# Patient Record
Sex: MALE | Race: WHITE | NOT HISPANIC OR LATINO | Employment: FULL TIME | URBAN - METROPOLITAN AREA
[De-identification: names, ages, dates, MRNs, and addresses within clinical notes are randomized per-mention and may not be internally consistent; named-entity substitution may affect disease eponyms.]

---

## 2023-10-08 ASSESSMENT — PROMIS GLOBAL HEALTH SCALE
RATE_QUALITY_OF_LIFE: GOOD
RATE_GENERAL_HEALTH: GOOD
EMOTIONAL_PROBLEMS: NEVER
CARRYOUT_SOCIAL_ACTIVITIES: VERY GOOD
RATE_SOCIAL_SATISFACTION: VERY GOOD
CARRYOUT_PHYSICAL_ACTIVITIES: COMPLETELY
RATE_MENTAL_HEALTH: VERY GOOD
RATE_PHYSICAL_HEALTH: VERY GOOD
RATE_AVERAGE_PAIN: 3

## 2023-10-12 ENCOUNTER — OFFICE VISIT (OUTPATIENT)
Dept: PRIMARY CARE | Facility: CLINIC | Age: 53
End: 2023-10-12
Payer: COMMERCIAL

## 2023-10-12 VITALS
HEIGHT: 68 IN | SYSTOLIC BLOOD PRESSURE: 134 MMHG | WEIGHT: 182 LBS | TEMPERATURE: 98.2 F | RESPIRATION RATE: 21 BRPM | HEART RATE: 68 BPM | DIASTOLIC BLOOD PRESSURE: 84 MMHG | OXYGEN SATURATION: 98 % | BODY MASS INDEX: 27.58 KG/M2

## 2023-10-12 DIAGNOSIS — Z00.00 ROUTINE MEDICAL EXAM: Primary | ICD-10-CM

## 2023-10-12 DIAGNOSIS — Z12.5 SCREENING FOR PROSTATE CANCER: ICD-10-CM

## 2023-10-12 LAB
ALBUMIN SERPL-MCNC: 4.7 G/DL (ref 3.5–5)
ALP BLD-CCNC: 117 U/L (ref 35–125)
ALT SERPL-CCNC: 21 U/L (ref 5–40)
ANION GAP SERPL CALC-SCNC: 14 MMOL/L
AST SERPL-CCNC: 17 U/L (ref 5–40)
BASOPHILS # BLD AUTO: 0.04 X10*3/UL (ref 0–0.1)
BASOPHILS NFR BLD AUTO: 0.7 %
BILIRUB SERPL-MCNC: 2.4 MG/DL (ref 0.1–1.2)
BUN SERPL-MCNC: 11 MG/DL (ref 8–25)
CALCIUM SERPL-MCNC: 9.4 MG/DL (ref 8.5–10.4)
CHLORIDE SERPL-SCNC: 104 MMOL/L (ref 97–107)
CHOLEST SERPL-MCNC: 229 MG/DL (ref 133–200)
CHOLEST/HDLC SERPL: 5.5 {RATIO}
CO2 SERPL-SCNC: 25 MMOL/L (ref 24–31)
CREAT SERPL-MCNC: 0.9 MG/DL (ref 0.4–1.6)
EOSINOPHIL # BLD AUTO: 0.03 X10*3/UL (ref 0–0.7)
EOSINOPHIL NFR BLD AUTO: 0.5 %
ERYTHROCYTE [DISTWIDTH] IN BLOOD BY AUTOMATED COUNT: 12.9 % (ref 11.5–14.5)
GFR SERPL CREATININE-BSD FRML MDRD: >90 ML/MIN/1.73M*2
GLUCOSE SERPL-MCNC: 104 MG/DL (ref 65–99)
HCT VFR BLD AUTO: 45.3 % (ref 41–52)
HDLC SERPL-MCNC: 42 MG/DL
HGB BLD-MCNC: 15 G/DL (ref 13.5–17.5)
IMM GRANULOCYTES # BLD AUTO: 0.01 X10*3/UL (ref 0–0.7)
IMM GRANULOCYTES NFR BLD AUTO: 0.2 % (ref 0–0.9)
LDLC SERPL CALC-MCNC: 172 MG/DL (ref 65–130)
LYMPHOCYTES # BLD AUTO: 1.37 X10*3/UL (ref 1.2–4.8)
LYMPHOCYTES NFR BLD AUTO: 24.2 %
MCH RBC QN AUTO: 30.1 PG (ref 26–34)
MCHC RBC AUTO-ENTMCNC: 33.1 G/DL (ref 32–36)
MCV RBC AUTO: 91 FL (ref 80–100)
MONOCYTES # BLD AUTO: 0.48 X10*3/UL (ref 0.1–1)
MONOCYTES NFR BLD AUTO: 8.5 %
NEUTROPHILS # BLD AUTO: 3.74 X10*3/UL (ref 1.2–7.7)
NEUTROPHILS NFR BLD AUTO: 65.9 %
NRBC BLD-RTO: 0 /100 WBCS (ref 0–0)
PLATELET # BLD AUTO: 291 X10*3/UL (ref 150–450)
PMV BLD AUTO: 10.4 FL (ref 7.5–11.5)
POTASSIUM SERPL-SCNC: 4.2 MMOL/L (ref 3.4–5.1)
PROT SERPL-MCNC: 7.2 G/DL (ref 5.9–7.9)
PSA SERPL-MCNC: 1.2 NG/ML
RBC # BLD AUTO: 4.98 X10*6/UL (ref 4.5–5.9)
SODIUM SERPL-SCNC: 143 MMOL/L (ref 133–145)
TRIGL SERPL-MCNC: 76 MG/DL (ref 40–150)
TSH SERPL DL<=0.05 MIU/L-ACNC: 1.46 MIU/L (ref 0.27–4.2)
WBC # BLD AUTO: 5.7 X10*3/UL (ref 4.4–11.3)

## 2023-10-12 PROCEDURE — 36415 COLL VENOUS BLD VENIPUNCTURE: CPT | Performed by: FAMILY MEDICINE

## 2023-10-12 PROCEDURE — 84443 ASSAY THYROID STIM HORMONE: CPT | Performed by: FAMILY MEDICINE

## 2023-10-12 PROCEDURE — 84153 ASSAY OF PSA TOTAL: CPT | Performed by: FAMILY MEDICINE

## 2023-10-12 PROCEDURE — 99386 PREV VISIT NEW AGE 40-64: CPT | Performed by: FAMILY MEDICINE

## 2023-10-12 PROCEDURE — 80061 LIPID PANEL: CPT | Performed by: FAMILY MEDICINE

## 2023-10-12 PROCEDURE — 85025 COMPLETE CBC W/AUTO DIFF WBC: CPT | Performed by: FAMILY MEDICINE

## 2023-10-12 PROCEDURE — 80053 COMPREHEN METABOLIC PANEL: CPT | Performed by: FAMILY MEDICINE

## 2023-10-12 PROCEDURE — 1036F TOBACCO NON-USER: CPT | Performed by: FAMILY MEDICINE

## 2023-10-12 ASSESSMENT — ENCOUNTER SYMPTOMS
CONSTITUTIONAL NEGATIVE: 1
RESPIRATORY NEGATIVE: 1
MUSCULOSKELETAL NEGATIVE: 1
NEUROLOGICAL NEGATIVE: 1
GASTROINTESTINAL NEGATIVE: 1
CARDIOVASCULAR NEGATIVE: 1

## 2023-10-12 ASSESSMENT — PAIN SCALES - GENERAL: PAINLEVEL: 0-NO PAIN

## 2023-10-12 NOTE — PROGRESS NOTES
"Subjective   Patient ID: Carson Harris is a 53 y.o. male who presents for Annual Exam (HERE FOR A PHYSICAL AND FASTING BLOODWORK).    HPI father with a fib.  MGF with heart disease    Review of Systems   Constitutional: Negative.    HENT: Negative.     Respiratory: Negative.     Cardiovascular: Negative.    Gastrointestinal: Negative.    Genitourinary: Negative.    Musculoskeletal: Negative.    Neurological: Negative.        Objective   /84 (BP Location: Right arm, Patient Position: Sitting, BP Cuff Size: Adult)   Pulse 68   Temp 36.8 °C (98.2 °F) (Oral)   Resp 21   Ht 1.727 m (5' 8\")   Wt 82.6 kg (182 lb)   SpO2 98%   BMI 27.67 kg/m²     Physical Exam  Vitals and nursing note reviewed.   Constitutional:       General: He is not in acute distress.  HENT:      Right Ear: Tympanic membrane and ear canal normal.      Left Ear: Tympanic membrane and ear canal normal.      Nose: Nose normal. No rhinorrhea.      Mouth/Throat:      Pharynx: Oropharynx is clear. No oropharyngeal exudate or posterior oropharyngeal erythema.      Comments: Dentition wnl  Eyes:      Extraocular Movements: Extraocular movements intact.      Conjunctiva/sclera: Conjunctivae normal.      Pupils: Pupils are equal, round, and reactive to light.   Neck:      Vascular: No carotid bruit.   Cardiovascular:      Rate and Rhythm: Normal rate and regular rhythm.      Heart sounds: Normal heart sounds. No murmur heard.  Pulmonary:      Breath sounds: Normal breath sounds. No wheezing or rhonchi.   Abdominal:      General: Bowel sounds are normal. There is no distension.      Palpations: Abdomen is soft. There is no mass.      Tenderness: There is no abdominal tenderness. There is no guarding or rebound.      Hernia: No hernia is present.   Musculoskeletal:         General: No swelling or tenderness. Normal range of motion.      Cervical back: Normal range of motion and neck supple.   Lymphadenopathy:      Cervical: No cervical adenopathy. "   Skin:     General: Skin is warm.      Findings: No rash.   Neurological:      General: No focal deficit present.      Mental Status: He is alert.         Assessment/Plan   Problem List Items Addressed This Visit    None  Visit Diagnoses         Codes    Routine medical exam    -  Primary Z00.00    Relevant Orders    CBC and Auto Differential    Comprehensive Metabolic Panel    TSH with reflex to Free T4 if abnormal    Lipid Panel    CT cardiac scoring wo IV contrast    Screening for prostate cancer     Z12.5    Relevant Orders    Prostate Specific Antigen

## 2023-11-10 ENCOUNTER — ANCILLARY PROCEDURE (OUTPATIENT)
Dept: RADIOLOGY | Facility: CLINIC | Age: 53
End: 2023-11-10
Payer: COMMERCIAL

## 2023-11-10 DIAGNOSIS — Z00.00 ROUTINE MEDICAL EXAM: ICD-10-CM

## 2023-11-10 PROCEDURE — 75571 CT HRT W/O DYE W/CA TEST: CPT

## 2024-06-11 ENCOUNTER — CLINICAL SUPPORT (OUTPATIENT)
Dept: ORTHOPEDIC SURGERY | Facility: CLINIC | Age: 54
End: 2024-06-11
Payer: COMMERCIAL

## 2024-06-11 ENCOUNTER — HOSPITAL ENCOUNTER (OUTPATIENT)
Dept: RADIOLOGY | Facility: HOSPITAL | Age: 54
Discharge: HOME | End: 2024-06-11
Payer: COMMERCIAL

## 2024-06-11 VITALS — BODY MASS INDEX: 27.58 KG/M2 | WEIGHT: 182 LBS | HEIGHT: 68 IN

## 2024-06-11 DIAGNOSIS — M25.529 ELBOW PAIN, UNSPECIFIED LATERALITY: ICD-10-CM

## 2024-06-11 PROCEDURE — 73080 X-RAY EXAM OF ELBOW: CPT | Mod: LEFT SIDE | Performed by: RADIOLOGY

## 2024-06-11 PROCEDURE — 73080 X-RAY EXAM OF ELBOW: CPT | Mod: LT

## 2024-06-11 PROCEDURE — 99204 OFFICE O/P NEW MOD 45 MIN: CPT

## 2024-06-11 ASSESSMENT — PAIN SCALES - GENERAL: PAINLEVEL_OUTOF10: 6

## 2024-06-11 ASSESSMENT — PAIN - FUNCTIONAL ASSESSMENT: PAIN_FUNCTIONAL_ASSESSMENT: 0-10

## 2024-06-11 ASSESSMENT — PAIN DESCRIPTION - DESCRIPTORS: DESCRIPTORS: ACHING;SHOOTING

## 2024-06-11 NOTE — PROGRESS NOTES
HPI  Carson Harris is a 53 y.o. male  in office today for   Chief Complaint   Patient presents with    Left Elbow - Edema, Pain     6/11/24 patient was walking his dog who tried to take off for a squirrle, he tried to brace himself from getting pulling forward on and tree and the elbow got pulled backwards. He has swelling and pain   .  he states this just happened a few hours ago, so no conservative treatment at this point      Medication  No current outpatient medications on file prior to visit.     No current facility-administered medications on file prior to visit.       Physical Exam  Constitutional: well developed, well nourished male in no acute distress  Psychiatric: normal mood, appropriate affect  Eyes: sclera anicteric  HENT: normocephalic/atraumatic  CV: regular rate and rhythm   Respiratory: non labored breathing  Integumentary: no rash  Neurological: moves all extremities      Shoulder Musculoskeletal Exam    Inspection    Left      Ecchymosis: none      Peripheral edema: none      Deformity comment: At elbow      Prior incision: none    Palpation    Left      Increased warmth: none      Tenderness: none    Range of Motion    Left      Active ROM: normal. Active ROM comment: at elbow.     Range of motion additional comments: Can not fully palpate the biceps tendon through the cuboidal fossa.    Strength    Left      Biceps: 4/5.     Special Tests    Left    Biceps/iza Signs      Jayro deformity: positive         Imaging/Lab:  X-rays were taken today which were reviewed by myself and read by myself and show no acute fracture or dislocation.      Assessment  Assessment: left elbow pain with concern for tendon rupture    Plan  Plan:  History, physical exam, and imaging were reviewed with patient. Given PE, mechanism of injury ordering MRI to assess for distal biceps tendon tear.  MRI orders were given and he should get done in the next few days.  RICE and pain medication as needed.  Follow Up: Will  follow up after MRI completed.  Will refer to Dr Childers if appropriate.    All questions were answered for the patient prior to end of exam and patient addressed their understanding.    Abby Stockton PA-C  06/11/24

## 2024-06-12 ENCOUNTER — HOSPITAL ENCOUNTER (OUTPATIENT)
Dept: RADIOLOGY | Facility: CLINIC | Age: 54
Discharge: HOME | End: 2024-06-12
Payer: COMMERCIAL

## 2024-06-12 DIAGNOSIS — M25.529 ELBOW PAIN, UNSPECIFIED LATERALITY: ICD-10-CM

## 2024-06-12 PROCEDURE — 73221 MRI JOINT UPR EXTREM W/O DYE: CPT | Mod: LEFT SIDE | Performed by: STUDENT IN AN ORGANIZED HEALTH CARE EDUCATION/TRAINING PROGRAM

## 2024-06-12 PROCEDURE — 73221 MRI JOINT UPR EXTREM W/O DYE: CPT | Mod: LT

## 2024-06-13 ENCOUNTER — TELEPHONE (OUTPATIENT)
Dept: ORTHOPEDIC SURGERY | Facility: CLINIC | Age: 54
End: 2024-06-13
Payer: COMMERCIAL

## 2024-06-13 NOTE — TELEPHONE ENCOUNTER
PER dinorah unger PA-C patient had an MRI of his left elbow and she recommend he go see Dr Childers in [place] to discuss surgery

## 2024-06-14 ENCOUNTER — APPOINTMENT (OUTPATIENT)
Dept: ORTHOPEDIC SURGERY | Facility: CLINIC | Age: 54
End: 2024-06-14
Payer: COMMERCIAL

## 2024-06-14 ENCOUNTER — OFFICE VISIT (OUTPATIENT)
Dept: ORTHOPEDIC SURGERY | Facility: CLINIC | Age: 54
End: 2024-06-14
Payer: COMMERCIAL

## 2024-06-14 VITALS — HEIGHT: 68 IN | BODY MASS INDEX: 27.28 KG/M2 | WEIGHT: 180 LBS

## 2024-06-14 DIAGNOSIS — S46.212A RUPTURE OF LEFT DISTAL BICEPS TENDON, INITIAL ENCOUNTER: Primary | ICD-10-CM

## 2024-06-14 PROCEDURE — 99215 OFFICE O/P EST HI 40 MIN: CPT | Performed by: ORTHOPAEDIC SURGERY

## 2024-06-14 PROCEDURE — 1036F TOBACCO NON-USER: CPT | Performed by: ORTHOPAEDIC SURGERY

## 2024-06-14 ASSESSMENT — PAIN SCALES - GENERAL: PAINLEVEL_OUTOF10: 5 - MODERATE PAIN

## 2024-06-14 ASSESSMENT — PAIN - FUNCTIONAL ASSESSMENT: PAIN_FUNCTIONAL_ASSESSMENT: 0-10

## 2024-06-14 NOTE — PROGRESS NOTES
53-year-old very active male who injured his left elbow on June 11 when he was grabbing a tree when he was falling.  He felt a pop in his elbow it is at proximal migration of his biceps.  He is here to review the results of an MRI left elbow    Patients' self reported past medical history, medications, allergies, surgical history, family and social history as well as a 10 point review of systems has been documented in the new patient intake form and scanned into the patient's electronic medical record.  The intake form was reviewed by Dr Childers during the office visit and signed by Dr. Childers and the patient.  Pertinent findings are documented in the HPI.    General Multi-System Physical Exam:  Constitutional  General appearance:  Alert, oriented, and in no acute distress.  Well developed, well nourished.  Head and Face  Head and face:  Normocephalic and atraumatic.  Ears, Nose, Mouth, and Throat  External inspection of ears and nose: Normal.  Eyes:  Pupils are equal and round.  Neck  Neck:  no neck mass was observed.  Pulmonary  Respiratory effort:  no respiratory distress.  Cardiovascular  Intact distal pulses.  Lymphatic  Palpation of lymph nodes in the affected extremity:  Normal.  Skin  Skin and subcutaneous tissue:  Normal skin color and pigmentation.  Normal skin turgor.  No rashes.  Neurologic  Sensation:  normal to light touch.  Psychiatric  Judgement and insight:  Intact.  Mood and affect:  Normal.  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  He has a normal hook test on the right elbow which is his normal elbow a abnormal hook test on his left elbow with the distal biceps tendon is not palpable.  Weakness in elbow flexion and supination strength neurovascular tact left upper extremity    Dr Childers personally reviewed the results of the x-rays that had been performed recently on this patient.    In addition, Dr Childers independently  interpreted the patient's x-rays (performed by the Radiology department) by viewing the x-ray images and this is Dr. Childers's personal interpretation:     Normal x-rays left elbow    Dr Childers independently interpreted the patient's MRI (performed by the Radiology department) by viewing the MRI images and this is Dr. Childers's personal interpretation:     Left elbow complete distal biceps tendon rupture    We had a long discussion regarding distal biceps tendon ruptures.  We talked about operative and nonoperative treatment options.  Patient decided he wants to proceed with a left distal biceps tendon repair on June 18.  See him back for surgery sooner if necessary        I, Dr. Childers, had a long discussion with the patient / patient's family regarding the risks versus benefits of surgery and all of the treatment options, including nonoperative treatment and surgical treatment options. We discussed the risks of surgery.      The risks of surgery include, but are not limited to, nerve damage, artery damage, muscle damage, risk of bleeding or infection, risk of bone fracture, risk of post-operative stiffness, risk of failure of the surgery with possible continued pain or possible need for additional surgery.  Other risks include the risk of hardware pain and possible need for removal of the surgical hardware at another time.   The risks of undergoing anesthesia including, but are not limited to, stroke, heart attack or death.      After a long discussion, the patient / patient's family understood all of the risks versus benefits of surgery and decided that they wanted to proceed with surgery. The patient / guardian signed appropriate surgical consent forms.    Since this patient will be undergoing surgery, I expect the patient to be in significant additional pain in the immediate postoperative period as a result of the surgical procedure. Non-opioid pain medications will not be sufficient to treat this acute, sharp,  postoperative pain. Therefore we will be prescribing the patient narcotic pain medications for the immediate postoperative period in addition to numerous non-narcotic pain medications in order to try to help the patient with their post-operative pain.  However, as the pain from surgery subsides, we will work diligently to wean the patient off of all narcotics as quickly as possible.   If the patient requires more narcotic pain medications than we typically see with patients undergoing this surgery, we will refer the patient to a pain management specialist within the first few weeks after their surgical procedure. The patient's OARRS report was reviewed within the last 90 days.        This patient has had a major injury to their affected extremity which has significantly traumatized it.  If surgery is not performed to repair the extremity, it is unlikely the patient's extremity would ever function normally again.  Non-operative treatment would probably fail, leaving this patient with significant disability.  That would classify this problem as an acute injury that poses a threat to the function of the patient's extremity.     We discussed their surgery at great length.  This is an elective major surgery which is warranted in this case due to the patient's severely injured extremity.  We discussed identified patient and procedural risk factors and how these risk factors may increase the risk of the surgery or influence the outcome of the surgical procedure.   We also discussed how delaying surgery and treating this injury non-operatively may lead to a progression of the injury/disease and high risk of further morbidity.

## 2024-06-18 ENCOUNTER — ANESTHESIA (OUTPATIENT)
Dept: OPERATING ROOM | Facility: HOSPITAL | Age: 54
End: 2024-06-18
Payer: COMMERCIAL

## 2024-06-18 ENCOUNTER — ANESTHESIA EVENT (OUTPATIENT)
Dept: OPERATING ROOM | Facility: HOSPITAL | Age: 54
End: 2024-06-18
Payer: COMMERCIAL

## 2024-06-18 ENCOUNTER — PHARMACY VISIT (OUTPATIENT)
Dept: PHARMACY | Facility: CLINIC | Age: 54
End: 2024-06-18
Payer: MEDICARE

## 2024-06-18 ENCOUNTER — HOSPITAL ENCOUNTER (OUTPATIENT)
Facility: HOSPITAL | Age: 54
Setting detail: OUTPATIENT SURGERY
Discharge: HOME | End: 2024-06-18
Attending: ORTHOPAEDIC SURGERY | Admitting: ORTHOPAEDIC SURGERY
Payer: COMMERCIAL

## 2024-06-18 VITALS
HEIGHT: 68 IN | SYSTOLIC BLOOD PRESSURE: 133 MMHG | DIASTOLIC BLOOD PRESSURE: 88 MMHG | BODY MASS INDEX: 27.28 KG/M2 | OXYGEN SATURATION: 95 % | TEMPERATURE: 97.4 F | WEIGHT: 180 LBS | HEART RATE: 67 BPM | RESPIRATION RATE: 20 BRPM

## 2024-06-18 DIAGNOSIS — S46.212A RUPTURE OF LEFT DISTAL BICEPS TENDON, INITIAL ENCOUNTER: Primary | ICD-10-CM

## 2024-06-18 PROCEDURE — 2500000004 HC RX 250 GENERAL PHARMACY W/ HCPCS (ALT 636 FOR OP/ED): Performed by: ANESTHESIOLOGY

## 2024-06-18 PROCEDURE — RXMED WILLOW AMBULATORY MEDICATION CHARGE

## 2024-06-18 PROCEDURE — 2500000004 HC RX 250 GENERAL PHARMACY W/ HCPCS (ALT 636 FOR OP/ED): Performed by: NURSE ANESTHETIST, CERTIFIED REGISTERED

## 2024-06-18 PROCEDURE — 2500000004 HC RX 250 GENERAL PHARMACY W/ HCPCS (ALT 636 FOR OP/ED): Mod: JZ | Performed by: NURSE ANESTHETIST, CERTIFIED REGISTERED

## 2024-06-18 PROCEDURE — C1713 ANCHOR/SCREW BN/BN,TIS/BN: HCPCS | Performed by: ORTHOPAEDIC SURGERY

## 2024-06-18 PROCEDURE — 7100000010 HC PHASE TWO TIME - EACH INCREMENTAL 1 MINUTE: Performed by: ORTHOPAEDIC SURGERY

## 2024-06-18 PROCEDURE — 7100000009 HC PHASE TWO TIME - INITIAL BASE CHARGE: Performed by: ORTHOPAEDIC SURGERY

## 2024-06-18 PROCEDURE — 2500000005 HC RX 250 GENERAL PHARMACY W/O HCPCS: Performed by: NURSE ANESTHETIST, CERTIFIED REGISTERED

## 2024-06-18 PROCEDURE — 2720000007 HC OR 272 NO HCPCS: Performed by: ORTHOPAEDIC SURGERY

## 2024-06-18 PROCEDURE — 7100000001 HC RECOVERY ROOM TIME - INITIAL BASE CHARGE: Performed by: ORTHOPAEDIC SURGERY

## 2024-06-18 PROCEDURE — 3600000003 HC OR TIME - INITIAL BASE CHARGE - PROCEDURE LEVEL THREE: Performed by: ORTHOPAEDIC SURGERY

## 2024-06-18 PROCEDURE — 24342 REPAIR OF RUPTURED TENDON: CPT | Performed by: ORTHOPAEDIC SURGERY

## 2024-06-18 PROCEDURE — 2780000003 HC OR 278 NO HCPCS: Performed by: ORTHOPAEDIC SURGERY

## 2024-06-18 PROCEDURE — 3700000001 HC GENERAL ANESTHESIA TIME - INITIAL BASE CHARGE: Performed by: ORTHOPAEDIC SURGERY

## 2024-06-18 PROCEDURE — 3600000008 HC OR TIME - EACH INCREMENTAL 1 MINUTE - PROCEDURE LEVEL THREE: Performed by: ORTHOPAEDIC SURGERY

## 2024-06-18 PROCEDURE — 3700000002 HC GENERAL ANESTHESIA TIME - EACH INCREMENTAL 1 MINUTE: Performed by: ORTHOPAEDIC SURGERY

## 2024-06-18 PROCEDURE — 96372 THER/PROPH/DIAG INJ SC/IM: CPT | Performed by: NURSE ANESTHETIST, CERTIFIED REGISTERED

## 2024-06-18 PROCEDURE — 7100000002 HC RECOVERY ROOM TIME - EACH INCREMENTAL 1 MINUTE: Performed by: ORTHOPAEDIC SURGERY

## 2024-06-18 DEVICE — BUTTON, ALB, GRAFTMAX, ADJUSTABLE: Type: IMPLANTABLE DEVICE | Site: ARM | Status: FUNCTIONAL

## 2024-06-18 RX ORDER — ACETAMINOPHEN 500 MG
1000 TABLET ORAL EVERY 8 HOURS PRN
Qty: 90 TABLET | Refills: 0 | Status: SHIPPED | OUTPATIENT
Start: 2024-06-18

## 2024-06-18 RX ORDER — LABETALOL HYDROCHLORIDE 5 MG/ML
5 INJECTION, SOLUTION INTRAVENOUS ONCE AS NEEDED
Status: DISCONTINUED | OUTPATIENT
Start: 2024-06-18 | End: 2024-06-18 | Stop reason: HOSPADM

## 2024-06-18 RX ORDER — HYDROMORPHONE HYDROCHLORIDE 1 MG/ML
INJECTION, SOLUTION INTRAMUSCULAR; INTRAVENOUS; SUBCUTANEOUS AS NEEDED
Status: DISCONTINUED | OUTPATIENT
Start: 2024-06-18 | End: 2024-06-18

## 2024-06-18 RX ORDER — FENTANYL CITRATE 50 UG/ML
INJECTION, SOLUTION INTRAMUSCULAR; INTRAVENOUS AS NEEDED
Status: DISCONTINUED | OUTPATIENT
Start: 2024-06-18 | End: 2024-06-18

## 2024-06-18 RX ORDER — OXYCODONE AND ACETAMINOPHEN 5; 325 MG/1; MG/1
1 TABLET ORAL EVERY 4 HOURS PRN
Status: DISCONTINUED | OUTPATIENT
Start: 2024-06-18 | End: 2024-06-18 | Stop reason: HOSPADM

## 2024-06-18 RX ORDER — ALBUTEROL SULFATE 0.83 MG/ML
2.5 SOLUTION RESPIRATORY (INHALATION) ONCE AS NEEDED
Status: DISCONTINUED | OUTPATIENT
Start: 2024-06-18 | End: 2024-06-18 | Stop reason: HOSPADM

## 2024-06-18 RX ORDER — LIDOCAINE HYDROCHLORIDE 20 MG/ML
INJECTION, SOLUTION INFILTRATION; PERINEURAL AS NEEDED
Status: DISCONTINUED | OUTPATIENT
Start: 2024-06-18 | End: 2024-06-18

## 2024-06-18 RX ORDER — MORPHINE SULFATE 2 MG/ML
2 INJECTION, SOLUTION INTRAMUSCULAR; INTRAVENOUS EVERY 5 MIN PRN
Status: DISCONTINUED | OUTPATIENT
Start: 2024-06-18 | End: 2024-06-18 | Stop reason: HOSPADM

## 2024-06-18 RX ORDER — FAMOTIDINE 10 MG/ML
20 INJECTION INTRAVENOUS ONCE
Status: COMPLETED | OUTPATIENT
Start: 2024-06-18 | End: 2024-06-18

## 2024-06-18 RX ORDER — SODIUM CHLORIDE, SODIUM LACTATE, POTASSIUM CHLORIDE, CALCIUM CHLORIDE 600; 310; 30; 20 MG/100ML; MG/100ML; MG/100ML; MG/100ML
100 INJECTION, SOLUTION INTRAVENOUS CONTINUOUS
Status: DISCONTINUED | OUTPATIENT
Start: 2024-06-18 | End: 2024-06-18 | Stop reason: HOSPADM

## 2024-06-18 RX ORDER — MIDAZOLAM HYDROCHLORIDE 1 MG/ML
INJECTION, SOLUTION INTRAMUSCULAR; INTRAVENOUS AS NEEDED
Status: DISCONTINUED | OUTPATIENT
Start: 2024-06-18 | End: 2024-06-18

## 2024-06-18 RX ORDER — MEPERIDINE HYDROCHLORIDE 25 MG/ML
12.5 INJECTION INTRAMUSCULAR; INTRAVENOUS; SUBCUTANEOUS EVERY 10 MIN PRN
Status: DISCONTINUED | OUTPATIENT
Start: 2024-06-18 | End: 2024-06-18 | Stop reason: HOSPADM

## 2024-06-18 RX ORDER — ASPIRIN 325 MG
325 TABLET, DELAYED RELEASE (ENTERIC COATED) ORAL 2 TIMES DAILY
Qty: 60 TABLET | Refills: 0 | Status: SHIPPED | OUTPATIENT
Start: 2024-06-18 | End: 2024-07-18

## 2024-06-18 RX ORDER — CEFAZOLIN SODIUM 2 G/100ML
INJECTION, SOLUTION INTRAVENOUS AS NEEDED
Status: DISCONTINUED | OUTPATIENT
Start: 2024-06-18 | End: 2024-06-18

## 2024-06-18 RX ORDER — ROPIVACAINE HYDROCHLORIDE 5 MG/ML
INJECTION, SOLUTION EPIDURAL; INFILTRATION; PERINEURAL AS NEEDED
Status: DISCONTINUED | OUTPATIENT
Start: 2024-06-18 | End: 2024-06-18

## 2024-06-18 RX ORDER — DOCUSATE SODIUM 100 MG/1
100 CAPSULE, LIQUID FILLED ORAL 2 TIMES DAILY PRN
Qty: 28 CAPSULE | Refills: 0 | Status: SHIPPED | OUTPATIENT
Start: 2024-06-18 | End: 2024-07-02

## 2024-06-18 RX ORDER — ONDANSETRON HYDROCHLORIDE 2 MG/ML
4 INJECTION, SOLUTION INTRAVENOUS ONCE AS NEEDED
Status: DISCONTINUED | OUTPATIENT
Start: 2024-06-18 | End: 2024-06-18 | Stop reason: HOSPADM

## 2024-06-18 RX ORDER — ONDANSETRON HYDROCHLORIDE 2 MG/ML
INJECTION, SOLUTION INTRAVENOUS AS NEEDED
Status: DISCONTINUED | OUTPATIENT
Start: 2024-06-18 | End: 2024-06-18

## 2024-06-18 RX ORDER — MELOXICAM 15 MG/1
15 TABLET ORAL DAILY PRN
Qty: 30 TABLET | Refills: 0 | Status: SHIPPED | OUTPATIENT
Start: 2024-06-18 | End: 2024-07-18

## 2024-06-18 RX ORDER — LIDOCAINE HYDROCHLORIDE 10 MG/ML
0.1 INJECTION, SOLUTION EPIDURAL; INFILTRATION; INTRACAUDAL; PERINEURAL ONCE
Status: DISCONTINUED | OUTPATIENT
Start: 2024-06-18 | End: 2024-06-18 | Stop reason: HOSPADM

## 2024-06-18 RX ORDER — OXYCODONE HYDROCHLORIDE 5 MG/1
5 TABLET ORAL EVERY 6 HOURS PRN
Qty: 28 TABLET | Refills: 0 | Status: SHIPPED | OUTPATIENT
Start: 2024-06-18

## 2024-06-18 RX ORDER — PROPOFOL 10 MG/ML
INJECTION, EMULSION INTRAVENOUS AS NEEDED
Status: DISCONTINUED | OUTPATIENT
Start: 2024-06-18 | End: 2024-06-18

## 2024-06-18 RX ORDER — GABAPENTIN 300 MG/1
300 CAPSULE ORAL NIGHTLY
Qty: 5 CAPSULE | Refills: 0 | Status: SHIPPED | OUTPATIENT
Start: 2024-06-18 | End: 2024-06-23

## 2024-06-18 RX ORDER — KETOROLAC TROMETHAMINE 30 MG/ML
INJECTION, SOLUTION INTRAMUSCULAR; INTRAVENOUS AS NEEDED
Status: DISCONTINUED | OUTPATIENT
Start: 2024-06-18 | End: 2024-06-18

## 2024-06-18 RX ORDER — HYDRALAZINE HYDROCHLORIDE 20 MG/ML
5 INJECTION INTRAMUSCULAR; INTRAVENOUS EVERY 30 MIN PRN
Status: DISCONTINUED | OUTPATIENT
Start: 2024-06-18 | End: 2024-06-18 | Stop reason: HOSPADM

## 2024-06-18 RX ORDER — DROPERIDOL 2.5 MG/ML
0.62 INJECTION, SOLUTION INTRAMUSCULAR; INTRAVENOUS ONCE AS NEEDED
Status: DISCONTINUED | OUTPATIENT
Start: 2024-06-18 | End: 2024-06-18 | Stop reason: HOSPADM

## 2024-06-18 RX ORDER — DIPHENHYDRAMINE HYDROCHLORIDE 50 MG/ML
12.5 INJECTION INTRAMUSCULAR; INTRAVENOUS ONCE AS NEEDED
Status: DISCONTINUED | OUTPATIENT
Start: 2024-06-18 | End: 2024-06-18 | Stop reason: HOSPADM

## 2024-06-18 SDOH — HEALTH STABILITY: MENTAL HEALTH: CURRENT SMOKER: 0

## 2024-06-18 ASSESSMENT — PATIENT HEALTH QUESTIONNAIRE - PHQ9
2. FEELING DOWN, DEPRESSED OR HOPELESS: NOT AT ALL
1. LITTLE INTEREST OR PLEASURE IN DOING THINGS: NOT AT ALL
SUM OF ALL RESPONSES TO PHQ9 QUESTIONS 1 AND 2: 0

## 2024-06-18 ASSESSMENT — PAIN - FUNCTIONAL ASSESSMENT
PAIN_FUNCTIONAL_ASSESSMENT: 0-10

## 2024-06-18 ASSESSMENT — ENCOUNTER SYMPTOMS
DEPRESSION: 0
LOSS OF SENSATION IN FEET: 0
OCCASIONAL FEELINGS OF UNSTEADINESS: 0

## 2024-06-18 ASSESSMENT — PAIN SCALES - GENERAL
PAINLEVEL_OUTOF10: 4
PAINLEVEL_OUTOF10: 4
PAIN_LEVEL: 2
PAINLEVEL_OUTOF10: 4
PAINLEVEL_OUTOF10: 4
PAINLEVEL_OUTOF10: 2
PAINLEVEL_OUTOF10: 4

## 2024-06-18 ASSESSMENT — COLUMBIA-SUICIDE SEVERITY RATING SCALE - C-SSRS
6. HAVE YOU EVER DONE ANYTHING, STARTED TO DO ANYTHING, OR PREPARED TO DO ANYTHING TO END YOUR LIFE?: NO
1. IN THE PAST MONTH, HAVE YOU WISHED YOU WERE DEAD OR WISHED YOU COULD GO TO SLEEP AND NOT WAKE UP?: NO
2. HAVE YOU ACTUALLY HAD ANY THOUGHTS OF KILLING YOURSELF?: NO

## 2024-06-18 NOTE — DISCHARGE INSTRUCTIONS
Wound Care  Your dressing should remain intact and dry until seen by Dr. Childers in office.    You should leave your dressing on until your post op appointment. Please keep dressing clean and dry at all times. Call the office if dressings get wet.     Driving  Please do not drive until you are evaluated in the office after surgery.  You are considered an impaired  following surgery, and if you choose to drive, your insurance may not cover any accidents that may occur.     Post-operative Medication     1. Aspirin 325mg 1 tablet twice a day for 4 weeks following surgery.  Aspirin helps to reduce the risk of blood clots following surgery.      2. Colace 1 tablet twice a day.  Colace is used to prevent constipation while taking pain medication.    3.  Mobic 15 mg take 1 tablet daily for 5 days, after 5 days take 1 tablet daily only as needed for pain.     4.Tylenol 1gm every 8 hours for 5 days, after 5 days take 1gm every 8 hours only as needed for pain.     5.Gabapentin 300mg daily at bedtime for 5 days, after 5 days you should no longer need this medication.     6. Oxy IR 5mg every 6 hours ONLY TO BE TAKEN FOR SEVERE PAIN.  Do NOT take Oxy IR within 3 hours of taking Gabapentin. Do NOT take Gabapentin within 3 hours of taking Oxy IR. If you are having severe pain and taking Oxy IR at night skip the nightly Gabapentin and only resume when you are not taking Oxy IR at night     Signs and Symptoms of Complications  Although complications are rare the following are a list of symptoms you should be aware of.    Infection - increased pain not relieved with medication, fever, chills, redness, swelling or drainage from incision.  Blood Clot - swelling, tenderness, or pain to calf when you move your ankle up and down. If your calf is sore, please DO NOT massage calf.    If you experience chest pain, shortness of breath or difficulty breathing; please report to emergency room immediately.        If a follow-up visit after  surgery was not scheduled, please call Dr. Shore office at [phone] during office business hours (Monday to Friday, 8:30am to 3:30pm) to arrange a follow-up appointment for 2 weeks after your surgery.    If you have any questions, please call Dr. Shore office at [phone] during office business hours (Monday to Friday, 8:30am to 3:30pm).  Please do not call Dr. Shore office after 3:30pm or on weekends or holidays.  If you have an urgent issue after 3:30pm or on weekends or holidays, please go immediately to a local emergency room to be evaluated.

## 2024-06-18 NOTE — ANESTHESIA PROCEDURE NOTES
Airway  Date/Time: 6/18/2024 1:23 PM  Urgency: elective    Airway not difficult    Staffing  Performed: CRNA   Authorized by: DIGNA Bee    Performed by: DIGNA Bee  Patient location during procedure: OR    Indications and Patient Condition  Indications for airway management: anesthesia  Spontaneous ventilation: present  Sedation level: deep  Preoxygenated: yes  Patient position: sniffing  Mask difficulty assessment: 1 - vent by mask  Planned trial extubation    Final Airway Details  Final airway type: supraglottic airway      Successful airway: classic (i Gel)  Size 4     Number of attempts at approach: 1

## 2024-06-18 NOTE — ANESTHESIA PREPROCEDURE EVALUATION
Patient: Carson Harris    Procedure Information       Date/Time: 06/18/24 1235    Procedure: left distal biceps tendon repair; (Conmed rep and conmed graftmax button) *MINI C ARM* (Left: Elbow) - left distal biceps tendon repair; need Conmed rep and conmed graftmax button    Location: [facility] / [facility]    Surgeons: JOAQUIN Childers MD            Relevant Problems   Anesthesia (within normal limits)       Clinical information reviewed:   Tobacco  Allergies  Meds   Med Hx  Surg Hx   Fam Hx  Soc Hx        NPO Detail:  NPO/Void Status  Date of Last Liquid: 06/17/24  Date of Last Solid: 06/17/24         Physical Exam    Airway  Mallampati: II     Cardiovascular - normal exam     Dental - normal exam       Pulmonary - normal exam     Abdominal - normal exam           Anesthesia Plan    History of general anesthesia?: yes  History of complications of general anesthesia?: no    ASA 2     general and regional   (          GA  Left Interscalene Nerve Block)  The patient is not a current smoker.    intravenous induction   Postoperative administration of opioids is intended.  Anesthetic plan and risks discussed with patient.  Use of blood products discussed with patient who.    Plan discussed with CRNA.

## 2024-06-18 NOTE — OP NOTE
left distal biceps tendon repair (L) Operative Note     Date: 2024  OR Location: [facility]    Name: Carson Harris, : 1970, Age: 53 y.o., MRN: [id number], Sex: male    ORTHOPEDIC OPERATIVE REPORT / POST-OP NOTE    SURGEON:  Bimal Childers MD  ASSISTANT(S):  Adalberto Valadez SA  PRE-OPERATIVE DIAGNOSIS: Left elbow distal biceps tendon rupture  POST-OPERATIVE DIAGNOSIS:  Same  PROCEDURE: Left elbow distal biceps tendon repair  CPT CODE(S):  92716  ANESTHESIA:  general + regional  ESTIMATED BLOOD LOSS: Minimal  TOURNIQUET TIME: 40 minutes at 250 mmHg  SPECIMEN:  None  FINDINGS:  Above  COMPLICATIONS:  None  CONDITION:  Stable to the Recovery Room    I, Dr Childers, was present and scrubbed for the entire surgical procedure, including wound closure.         INDICATION FOR SURGERY:  53-year-old male with acute, traumatic injury to their left elbow.  The patient had an acute, traumatic injury to their elbow when they felt a pop in the elbow and had significant pain.  The patient developed ecchymosis in this area and noticed a Jayro deformity with the biceps muscle being migrated too far proximally.  On physical examination, the patient had an abnormal hook test where the distal biceps tendon could not be palpated and the patient had significant weakness with elbow flexion strength and supination strength.  MRI confirmed complete rupture of the distal biceps tendon from the bicipital tuberosity of the proximal radius.  We discussed the operative and nonoperative treatment options and the risks versus benefits of each.  The patient understood all the risks versus benefits of operative and non-operative treatment options.  The risks of distal biceps tendon repair were discussed, which included but were not limited to: risk of continued pain or re-rupture of the distal biceps tendon, risk of injury to the lateral antebrachial cutaneous nerve or other nerves in the forearm, risk of synostosis, risk of fracture to the  proximal radius either intra-operatively or postoperatively, risks of infection, bleeding, nerve, artery, or muscle damage, risk of need for additional surgery, risk of anesthesia including risks of heart attack, stroke, or even death.  The patient wanted to proceed with surgery and signed appropriate surgical consents.  On the morning of surgery, I signed the patient's operative elbow in the preoperative holding area.      PROCEDURE:  The patient was brought to the operating room after anesthesia performed a block on the patient's operative upper extremity.  The patient was placed supine on the operating room table and all of their bony prominences were padded.  A operating room huddle was performed and the patient received IV antibiotics.  The patient was kept in the supine position with the operative arm on an armboard with all of their luicana prominences padded and SCDs were applied to the lower extremities and used throughout the procedure.   A nonsterile tourniquet was then placed as high as possible on the operative upper extremity.  The patient's left upper extremity was prepped and draped in the normal sterile fashion.  A pre-incision timeout was called.  An Esmarch was used and the tourniquet was inflated.    A transverse incision was made 2 cm distal to the elbow skin crease centered over the proximal radius.  Hemostasis was obtained with electrocautery.  The dissection was taken down bluntly into the fascia.  Using blunt dissection, the ruptured distal biceps tendon was identified and freed of any scar tissue.  The end of the distal biceps tendon was freshened to healthy bleeding tissue.  A #2 FiberWire suture was run up 1 side of the biceps tendon back down the other side, out through the end of the biceps tendon, through a GraftMax button, and then back up the biceps tendon where the suture was sewn to itself.  The sutures were sewn so that the Graftmax button was left 5 mm from the end of the distal  biceps tendon.  This was then repeated again with another #2 high-strength suture.  A leading and trailing sutures were placed through the Graftmax button and the distal tendon was sized to be a size 7 mm.      Blunt dissection was then performed tracking down the original tract of the distal biceps tendon to the bicipital tuberosity on the proximal radius.  This tract was carefully widened to gain exposure to the bicipital tuberosity with the location being confirmed using fluoroscopic guidance.  Appropriate retractors were placed.  The arm was put into maximal supination and a Beath needle was drilled from anterior to posterior through the bicipital tuberosity.  The anterior cortex of the bicipital tuberosity was reamed using a size 7 mm reamer and then the posterior cortex of the bicipital tuberosity was reamed using a size 5 reamer.  The elbow incision was thoroughly irrigated to irrigate out any bone dust or bone fragments.  With the elbow still held in maximal supination, the Beath needle was then passed through the forearm and brought out through the posterior aspect of the forearm, aiming ulnarly, to shuttle a shuttling suture through the hole in the bicipital tuberosity.  The shuttling suture was then used to shuttle the leading and trailing sutures attached to the Graftmax button through the tunnel in the proximal radius.  The elbow was then flexed and the sutures were pulled, pulling the Graftmax button and the distal biceps tendon into the tunnel in the bicipital tuberosity, and flipping the graft Max button over the posterior cortex of the proximal radius.  The biceps tendon was then palpated and found to have excellent tension.  Fluoroscopic guidance was used to confirm that the Graftmax button was fully through the tunnel in the bicipital tuberosity and properly flipped over the posterior cortex.  The leading and trailing sutures attached to the Graftmax button were then gently removed.    From this  point on in the surgery, the elbow was no longer extended beyond 45 degrees of flexion so as to not put too much stress on the newly repaired distal biceps tendon.  The elbow incision was thoroughly irrigated, the tourniquet was let down, and hemostasis was obtained using electrocautery.  The skin was then closed in layers with 0 Vicryl interrupted deep sutures, 2-0 Vicryl interrupted subcutaneous sutures, and 3-0 nylon horizontal mattress sutures in the skin.  Xeroform, dry sterile dressing, webril, and a posterior splint immobilizing the elbow and wrist at 90 degrees of elbow flexion and neutral forearm rotation was placed and allowed to dry.  The patient was then awoken and the sling was placed on the operative upper extremity.  The patient was brought to the recovery room in good condition.  There were no complications.

## 2024-06-18 NOTE — ANESTHESIA PROCEDURE NOTES
"Peripheral Block    Patient location during procedure: pre-op  Start time: 6/18/2024 11:47 AM  End time: 6/18/2024 11:54 AM  Reason for block: at surgeon's request and post-op pain management  Staffing  Performed: CRNA   Authorized by: DIGNA Polo    Performed by: DIGNA Polo  Preanesthetic Checklist  Completed: patient identified, IV checked, site marked, risks and benefits discussed, surgical consent, monitors and equipment checked, pre-op evaluation and timeout performed   Timeout performed at: 6/18/2024 11:47 AM  Peripheral Block  Patient position: sitting  Prep: ChloraPrep  Patient monitoring: continuous pulse ox, cardiac monitor and heart rate  Block type: interscalene  Laterality: left  Injection technique: single-shot  Guidance: ultrasound guided  Local infiltration: ropivacaine  Infiltration strength: 0.5 %  Dose: 30 mL  Needle  Needle type: 100mm pajunk stim.   Needle localization: anatomical landmarks and ultrasound guidance  Assessment  Injection assessment: negative aspiration for heme, no paresthesia on injection, incremental injection and local visualized surrounding nerve on ultrasound  Paresthesia pain: none  Heart rate change: no  Slow fractionated injection: yes  Additional Notes  Anesthesia consult was placed by  _Alvarado_____for post procedural analgesia.  The patient's chart was reviewed and they were deemed an appropriate candidate for the procedure. The patient was educated in detail on the risks, benefits, and alternatives to the block including but not limited to: temporary or permanent nerve damage, bleeding, infection, damage to surrounding tissues, possible block failure and the potential for local anesthesia toxicity syndrome.  The patient expressed understanding and all questions were answered prior to the initiation of the procedure.  Informed consent was also signed by the patient and laterality determined per institutional policy.  A formal \"time out " "\"consistent with the institutions rules and regulations was performed by the anesthesia provider and appropriate RN.     Procedure  The patient was placed in the sitting position. The LEFT side was marked and skin prep applied and allowed to dry. Proper monitors were applied with oxygen.  Sedation was provided by administering     Versed 2____ mg IV  Fentanyl _100___mcg IV    A high frequency linear ultrasound probe with probe cover and sterile coupling gel was applied over the anterior neck and C-5/6/7 roots tightly located. The fascial planes between the anterior and middle scalene muscles as well as the great vessels of the neck were also identified. The probe was then positioned for a short axis view structural view, allowing for exclusion of any nearby vessels,and doppler mode was engaged to assist in this identification.   An ECHOGENIC BLOCK NEEDLE was then advanced maintaining an in-plane visualization throughout the procedure, under ultrasound guidance from lateral to medial to come to rest just deep to the neurovascular sheath, but avoiding contact of the brachial plexus.  Upon negative aspiration, 30 ml 0.5% Ropivacaine with 4 mg decadron preservative free was administered safely and cautiously between the muscle planes.  Aspiration every 3-5mls was done to avoid potential intravascular injection.  All injections were done without resistance and were free of blood/ CSF.  The patient tolerated the procedure well without report of intense pain, tinnitus, metallic taste, or circumoral numbness.  The block was then evaluated after a few minutes and appeared to be functioning appropriately.               "

## 2024-06-18 NOTE — ANESTHESIA POSTPROCEDURE EVALUATION
Patient: Carson Harris    Procedure Summary       Date: 06/18/24 Room / Location: [facility] / [facility]    Anesthesia Start: 1309 Anesthesia Stop: 1509    Procedure: left distal biceps tendon repair (Left: Elbow) Diagnosis:       Rupture of left distal biceps tendon, initial encounter      (Rupture of left distal biceps tendon, initial encounter [S46.212A])    Surgeons: JOAQUIN Childers MD Responsible Provider: DIGNA Bee    Anesthesia Type: general, regional ASA Status: 2            Anesthesia Type: general, regional    Vitals Value Taken Time   /100 06/18/24 1541   Temp 36.3 °C (97.4 °F) 06/18/24 1506   Pulse 66 06/18/24 1549   Resp 10 06/18/24 1549   SpO2 95 % 06/18/24 1549   Vitals shown include unfiled device data.    Anesthesia Post Evaluation    Patient location during evaluation: PACU  Patient participation: complete - patient participated  Level of consciousness: awake  Pain score: 2  Pain management: adequate  Airway patency: patent  Cardiovascular status: acceptable  Respiratory status: acceptable  Hydration status: acceptable  Postoperative Nausea and Vomiting: none    No notable events documented.

## 2024-06-19 ENCOUNTER — APPOINTMENT (OUTPATIENT)
Dept: RADIOLOGY | Facility: HOSPITAL | Age: 54
End: 2024-06-19
Payer: COMMERCIAL

## 2024-06-19 ASSESSMENT — PAIN SCALES - GENERAL: PAINLEVEL_OUTOF10: 3

## 2024-07-03 ENCOUNTER — APPOINTMENT (OUTPATIENT)
Dept: ORTHOPEDIC SURGERY | Facility: CLINIC | Age: 54
End: 2024-07-03
Payer: COMMERCIAL

## 2024-07-03 VITALS — BODY MASS INDEX: 27.37 KG/M2 | WEIGHT: 180 LBS

## 2024-07-03 DIAGNOSIS — S46.212A RUPTURE OF LEFT DISTAL BICEPS TENDON, INITIAL ENCOUNTER: Primary | ICD-10-CM

## 2024-07-03 DIAGNOSIS — Z98.890 STATUS POST TENDON REPAIR: ICD-10-CM

## 2024-07-03 PROCEDURE — 1036F TOBACCO NON-USER: CPT

## 2024-07-03 PROCEDURE — L3761 EO, ADJ LOCK JOINT PREFAB OT: HCPCS

## 2024-07-03 PROCEDURE — 99024 POSTOP FOLLOW-UP VISIT: CPT

## 2024-07-03 ASSESSMENT — PAIN - FUNCTIONAL ASSESSMENT: PAIN_FUNCTIONAL_ASSESSMENT: 0-10

## 2024-07-03 ASSESSMENT — PAIN SCALES - GENERAL: PAINLEVEL_OUTOF10: 2

## 2024-07-03 NOTE — PROGRESS NOTES
History of Present Illness  Chief Complaint   Patient presents with    Left Elbow - Post-op     6/18/24 lt distal bicep repair     Patient returns today denying any significant pain.  States it feels a little tight around the incision, but no complaints otherwise.  No new issues or concerns.     Review of Systems   GENERAL: Negative for malaise, significant weight loss, fever  MUSCULOSKELETAL: see HPI  NEURO:  Negative     Examination  side: left elbo  Healthy incision without erythema, warmth, or drainage   Sensation intact median, ulnar and radial nerve distribution   ROM purposely limited post procedure.       Assessment:  Patient status post side: left distal biceps tendon repair     Plan  Patient placed into hinged elbow brace locked at 90 for two weeks, then remain in the brace for 2 more weeks but unlocked.  Active ROM at 4 weeks post op.  No lifting or WB.  Sutures removed today. Wound is healing nicely. Discussed incision care.  No PT at this time.  Follow-up: in 4 weeks    Abby Stockton PA-C  07/03/24

## 2024-08-07 ENCOUNTER — APPOINTMENT (OUTPATIENT)
Dept: ORTHOPEDIC SURGERY | Facility: CLINIC | Age: 54
End: 2024-08-07
Payer: COMMERCIAL

## 2024-08-14 ENCOUNTER — APPOINTMENT (OUTPATIENT)
Dept: ORTHOPEDIC SURGERY | Facility: CLINIC | Age: 54
End: 2024-08-14
Payer: COMMERCIAL

## 2024-08-14 DIAGNOSIS — S46.212A RUPTURE OF LEFT DISTAL BICEPS TENDON, INITIAL ENCOUNTER: Primary | ICD-10-CM

## 2024-08-14 PROCEDURE — 1036F TOBACCO NON-USER: CPT | Performed by: ORTHOPAEDIC SURGERY

## 2024-08-14 PROCEDURE — 99024 POSTOP FOLLOW-UP VISIT: CPT | Performed by: ORTHOPAEDIC SURGERY

## 2024-08-14 ASSESSMENT — PAIN - FUNCTIONAL ASSESSMENT: PAIN_FUNCTIONAL_ASSESSMENT: NO/DENIES PAIN

## 2024-08-14 NOTE — PROGRESS NOTES
2 months status post left distal biceps tendon repair.  Patient doing very well with no complaints    On physical examination he has full range of motion of the left elbow with excellent bicep strength.  He did have a small observable stitch that the body was spitting out at the incision which I Betadine to the area and then removed the stitch that was being spit out and applied a Band-Aid.  Neurovasc intact left upper extremity    Patient doing very well.  He should hold off on any strengthening for another month and then slowly return back to full strength over 3 months of the left elbow.  See me back as needed

## (undated) DEVICE — DRAPE, SHEET, THREE QUARTER, FAN FOLD, 57 X 77 IN

## (undated) DEVICE — Device

## (undated) DEVICE — DRAPE, SHEET, 17 X 23 IN

## (undated) DEVICE — DRAPE, SHEET, U, STERI DRAPE, 47 X 51 IN, DISPOSABLE, STERILE

## (undated) DEVICE — CAUTERY, PENCIL, PUSH BUTTON, SMOKE EVAC, 70MM

## (undated) DEVICE — GLOVE, PROTEXIS PI CLASSIC, SZ-7.5, PF, LF

## (undated) DEVICE — SUTURE, ETHILON, 4-0, BLK, MONO, PS-2 18

## (undated) DEVICE — DRESSING, GAUZE, SPONGE, VERSALON, ALL PURPOSE, 4 X 4 IN, SOFT

## (undated) DEVICE — ADHESIVE, SKIN, MASTISOL, 2/3 CC VIAL

## (undated) DEVICE — PAD, GROUNDING, ELECTROSURGICAL, W/9 FT CABLE, POLYHESIVE II, ADULT, LF

## (undated) DEVICE — SUTURE, MONOCRYL, 3-0, PS-1 27IN, UNDYED

## (undated) DEVICE — SUTURE, FIBERWIRE 2, T-5 TAPER NEEDLE, 38"

## (undated) DEVICE — TOWEL PACK, STERILE, 4/PACK, BLUE

## (undated) DEVICE — DRAPE, INCISE, ANTIMICROBIAL, IOBAN 2, LARGE, 17 X 23 IN, DISPOSABLE, STERILE

## (undated) DEVICE — NEEDLE, HYPODERMIC, REGULAR WALL, REGULAR BEVEL, 25 G X 1.5 IN

## (undated) DEVICE — DRESSING, TRANSPARENT, TEGADERM, 4 X 4-3/4 IN

## (undated) DEVICE — KIT, UPPER EXTREMITY, CUSTOM, PORTAGE

## (undated) DEVICE — STOCKINETTE, IMPERVIOUS, 9 X 48 IN, STERILE

## (undated) DEVICE — APPLICATOR, CHLORAPREP, W/ORANGE TINT, 26ML

## (undated) DEVICE — DRAPE, SHEET, U, W/ADHESIVE STRIP, IMPERVIOUS, 60 X 70 IN, DISPOSABLE, LF, STERILE

## (undated) DEVICE — BANDAGE, GAUZE, CONFORMING, KERLIX, 6 PLY, 4.5 IN X 4.1 YD

## (undated) DEVICE — SYRINGE, 30 CC, LUER LOCK

## (undated) DEVICE — SUTURE, VICRYL, 0, 36 IN, CT-1, UNDYED

## (undated) DEVICE — SUTURE, PROLENE, 3-0, 18 IN, PS2, BLUE

## (undated) DEVICE — GLOVE, SURGICAL, PROTEXIS PI BLUE W/NEUTHERA, 8.0, PF, LF

## (undated) DEVICE — NEEDLE, HYPODERMIC, REGULAR WALL, REGULAR BEVEL, 22 G X 1.5 IN

## (undated) DEVICE — COVER HANDLE LIGHT, STERIS, BLUE, STERILE